# Patient Record
Sex: FEMALE | Race: WHITE | NOT HISPANIC OR LATINO | URBAN - METROPOLITAN AREA
[De-identification: names, ages, dates, MRNs, and addresses within clinical notes are randomized per-mention and may not be internally consistent; named-entity substitution may affect disease eponyms.]

---

## 2017-09-05 ENCOUNTER — INPATIENT (INPATIENT)
Facility: HOSPITAL | Age: 52
LOS: 0 days | Discharge: HOME | End: 2017-09-06
Attending: HOSPITALIST | Admitting: FAMILY MEDICINE
Payer: COMMERCIAL

## 2017-09-05 VITALS — WEIGHT: 134.92 LBS | HEIGHT: 55 IN

## 2017-09-05 LAB
ALBUMIN SERPL ELPH-MCNC: 4.5 G/DL — SIGNIFICANT CHANGE UP (ref 3.3–5)
ALP SERPL-CCNC: 43 U/L — SIGNIFICANT CHANGE UP (ref 40–120)
ALT FLD-CCNC: 24 U/L — SIGNIFICANT CHANGE UP (ref 12–78)
ANION GAP SERPL CALC-SCNC: 7 MMOL/L — SIGNIFICANT CHANGE UP (ref 5–17)
APTT BLD: 31.7 SEC — SIGNIFICANT CHANGE UP (ref 27.5–37.4)
AST SERPL-CCNC: 25 U/L — SIGNIFICANT CHANGE UP (ref 15–37)
BASOPHILS # BLD AUTO: 0.1 K/UL — SIGNIFICANT CHANGE UP (ref 0–0.2)
BASOPHILS NFR BLD AUTO: 0.7 % — SIGNIFICANT CHANGE UP (ref 0–2)
BILIRUB SERPL-MCNC: 0.5 MG/DL — SIGNIFICANT CHANGE UP (ref 0.2–1.2)
BUN SERPL-MCNC: 14 MG/DL — SIGNIFICANT CHANGE UP (ref 7–23)
CALCIUM SERPL-MCNC: 9.2 MG/DL — SIGNIFICANT CHANGE UP (ref 8.5–10.1)
CHLORIDE SERPL-SCNC: 103 MMOL/L — SIGNIFICANT CHANGE UP (ref 96–108)
CK SERPL-CCNC: 81 U/L — SIGNIFICANT CHANGE UP (ref 26–192)
CO2 SERPL-SCNC: 27 MMOL/L — SIGNIFICANT CHANGE UP (ref 22–31)
CREAT SERPL-MCNC: 0.78 MG/DL — SIGNIFICANT CHANGE UP (ref 0.5–1.3)
D DIMER BLD IA.RAPID-MCNC: 248 NG/ML DDU — HIGH
EOSINOPHIL # BLD AUTO: 0.2 K/UL — SIGNIFICANT CHANGE UP (ref 0–0.5)
EOSINOPHIL NFR BLD AUTO: 1.9 % — SIGNIFICANT CHANGE UP (ref 0–6)
GLUCOSE SERPL-MCNC: 84 MG/DL — SIGNIFICANT CHANGE UP (ref 70–99)
HCT VFR BLD CALC: 41.2 % — SIGNIFICANT CHANGE UP (ref 34.5–45)
HGB BLD-MCNC: 14.3 G/DL — SIGNIFICANT CHANGE UP (ref 11.5–15.5)
INR BLD: 1.05 RATIO — SIGNIFICANT CHANGE UP (ref 0.88–1.16)
LYMPHOCYTES # BLD AUTO: 1.8 K/UL — SIGNIFICANT CHANGE UP (ref 1–3.3)
LYMPHOCYTES # BLD AUTO: 21.2 % — SIGNIFICANT CHANGE UP (ref 13–44)
MCHC RBC-ENTMCNC: 30.5 PG — SIGNIFICANT CHANGE UP (ref 27–34)
MCHC RBC-ENTMCNC: 34.6 GM/DL — SIGNIFICANT CHANGE UP (ref 32–36)
MCV RBC AUTO: 88.2 FL — SIGNIFICANT CHANGE UP (ref 80–100)
MONOCYTES # BLD AUTO: 0.5 K/UL — SIGNIFICANT CHANGE UP (ref 0–0.9)
MONOCYTES NFR BLD AUTO: 6 % — SIGNIFICANT CHANGE UP (ref 2–14)
NEUTROPHILS # BLD AUTO: 6.1 K/UL — SIGNIFICANT CHANGE UP (ref 1.8–7.4)
NEUTROPHILS NFR BLD AUTO: 70.3 % — SIGNIFICANT CHANGE UP (ref 43–77)
NT-PROBNP SERPL-SCNC: 61 PG/ML — SIGNIFICANT CHANGE UP (ref 0–125)
PLATELET # BLD AUTO: 272 K/UL — SIGNIFICANT CHANGE UP (ref 150–400)
POTASSIUM SERPL-MCNC: 3.8 MMOL/L — SIGNIFICANT CHANGE UP (ref 3.5–5.3)
POTASSIUM SERPL-SCNC: 3.8 MMOL/L — SIGNIFICANT CHANGE UP (ref 3.5–5.3)
PROT SERPL-MCNC: 7.9 GM/DL — SIGNIFICANT CHANGE UP (ref 6–8.3)
PROTHROM AB SERPL-ACNC: 11.3 SEC — SIGNIFICANT CHANGE UP (ref 9.8–12.7)
RBC # BLD: 4.68 M/UL — SIGNIFICANT CHANGE UP (ref 3.8–5.2)
RBC # FLD: 11.7 % — SIGNIFICANT CHANGE UP (ref 10.3–14.5)
SODIUM SERPL-SCNC: 137 MMOL/L — SIGNIFICANT CHANGE UP (ref 135–145)
TROPONIN I SERPL-MCNC: <0.015 NG/ML — SIGNIFICANT CHANGE UP (ref 0.01–0.04)
WBC # BLD: 8.7 K/UL — SIGNIFICANT CHANGE UP (ref 3.8–10.5)
WBC # FLD AUTO: 8.7 K/UL — SIGNIFICANT CHANGE UP (ref 3.8–10.5)

## 2017-09-05 PROCEDURE — 93010 ELECTROCARDIOGRAM REPORT: CPT

## 2017-09-05 PROCEDURE — 71275 CT ANGIOGRAPHY CHEST: CPT | Mod: 26

## 2017-09-05 PROCEDURE — 71020: CPT | Mod: 26

## 2017-09-05 PROCEDURE — 99285 EMERGENCY DEPT VISIT HI MDM: CPT

## 2017-09-05 RX ORDER — SODIUM CHLORIDE 9 MG/ML
3 INJECTION INTRAMUSCULAR; INTRAVENOUS; SUBCUTANEOUS ONCE
Qty: 0 | Refills: 0 | Status: COMPLETED | OUTPATIENT
Start: 2017-09-05 | End: 2017-09-05

## 2017-09-05 RX ADMIN — SODIUM CHLORIDE 3 MILLILITER(S): 9 INJECTION INTRAMUSCULAR; INTRAVENOUS; SUBCUTANEOUS at 21:11

## 2017-09-05 NOTE — ED STATDOCS - CHEST RISK OF DEAD HOSPITALIZATION
An abnormal EKG requiring continuous monitoring/Persistent or servere chest pain or anginal equivalent pain

## 2017-09-05 NOTE — ED STATDOCS - OBJECTIVE STATEMENT
50 y/o female with PMHx of Hypothyroidism presents to the ED c/o intermittent chest pain, tightness x 6 days. On synthroid. Took ASA PTA. Denies abd pain, back pain.

## 2017-09-05 NOTE — ED STATDOCS - PROGRESS NOTE DETAILS
pt with negative cta, abnormal ekg, q waves in v1 and v2, with intermittent non reproducable chest pain, pt tba, took asa pta

## 2017-09-05 NOTE — ED ADULT NURSE NOTE - OBJECTIVE STATEMENT
Pt c/o chest pain which started Thursday but has since worsened. Now pt states she came in because pain hasn't gone away. Feels it in L chest region side by ribs.

## 2017-09-05 NOTE — ED STATDOCS - EYES, MLM
2/5/2018              Mic Garcia        [address] [facility]         Dear Leeann Ambriz,    It was a pleasure to see you. Your PAP test was normal.  There is no need for further testing at this time.   I look forward to seeing you at you clear bilaterally.  Pupils equal, round, and reactive to light.

## 2017-09-06 ENCOUNTER — TRANSCRIPTION ENCOUNTER (OUTPATIENT)
Age: 52
End: 2017-09-06

## 2017-09-06 VITALS
DIASTOLIC BLOOD PRESSURE: 67 MMHG | TEMPERATURE: 98 F | HEART RATE: 60 BPM | RESPIRATION RATE: 18 BRPM | SYSTOLIC BLOOD PRESSURE: 106 MMHG | OXYGEN SATURATION: 98 %

## 2017-09-06 LAB
ALBUMIN SERPL ELPH-MCNC: 4 G/DL — SIGNIFICANT CHANGE UP (ref 3.3–5)
ALP SERPL-CCNC: 39 U/L — LOW (ref 40–120)
ALT FLD-CCNC: 19 U/L — SIGNIFICANT CHANGE UP (ref 12–78)
ANION GAP SERPL CALC-SCNC: 5 MMOL/L — SIGNIFICANT CHANGE UP (ref 5–17)
AST SERPL-CCNC: 23 U/L — SIGNIFICANT CHANGE UP (ref 15–37)
BASOPHILS # BLD AUTO: 0.1 K/UL — SIGNIFICANT CHANGE UP (ref 0–0.2)
BASOPHILS NFR BLD AUTO: 1.1 % — SIGNIFICANT CHANGE UP (ref 0–2)
BILIRUB SERPL-MCNC: 0.6 MG/DL — SIGNIFICANT CHANGE UP (ref 0.2–1.2)
BUN SERPL-MCNC: 10 MG/DL — SIGNIFICANT CHANGE UP (ref 7–23)
CALCIUM SERPL-MCNC: 9.1 MG/DL — SIGNIFICANT CHANGE UP (ref 8.5–10.1)
CHLORIDE SERPL-SCNC: 106 MMOL/L — SIGNIFICANT CHANGE UP (ref 96–108)
CHOLEST SERPL-MCNC: 178 MG/DL — SIGNIFICANT CHANGE UP (ref 10–199)
CK SERPL-CCNC: 70 U/L — SIGNIFICANT CHANGE UP (ref 26–192)
CO2 SERPL-SCNC: 30 MMOL/L — SIGNIFICANT CHANGE UP (ref 22–31)
CREAT SERPL-MCNC: 0.72 MG/DL — SIGNIFICANT CHANGE UP (ref 0.5–1.3)
EOSINOPHIL # BLD AUTO: 0.1 K/UL — SIGNIFICANT CHANGE UP (ref 0–0.5)
EOSINOPHIL NFR BLD AUTO: 2.1 % — SIGNIFICANT CHANGE UP (ref 0–6)
GLUCOSE SERPL-MCNC: 84 MG/DL — SIGNIFICANT CHANGE UP (ref 70–99)
HCT VFR BLD CALC: 40.8 % — SIGNIFICANT CHANGE UP (ref 34.5–45)
HDLC SERPL-MCNC: 78 MG/DL — SIGNIFICANT CHANGE UP (ref 40–125)
HGB BLD-MCNC: 13.8 G/DL — SIGNIFICANT CHANGE UP (ref 11.5–15.5)
LIPID PNL WITH DIRECT LDL SERPL: 92 MG/DL — SIGNIFICANT CHANGE UP
LYMPHOCYTES # BLD AUTO: 1.5 K/UL — SIGNIFICANT CHANGE UP (ref 1–3.3)
LYMPHOCYTES # BLD AUTO: 25.5 % — SIGNIFICANT CHANGE UP (ref 13–44)
MAGNESIUM SERPL-MCNC: 2.4 MG/DL — SIGNIFICANT CHANGE UP (ref 1.6–2.6)
MCHC RBC-ENTMCNC: 30.4 PG — SIGNIFICANT CHANGE UP (ref 27–34)
MCHC RBC-ENTMCNC: 33.8 GM/DL — SIGNIFICANT CHANGE UP (ref 32–36)
MCV RBC AUTO: 89.9 FL — SIGNIFICANT CHANGE UP (ref 80–100)
MONOCYTES # BLD AUTO: 0.4 K/UL — SIGNIFICANT CHANGE UP (ref 0–0.9)
MONOCYTES NFR BLD AUTO: 6.1 % — SIGNIFICANT CHANGE UP (ref 2–14)
NEUTROPHILS # BLD AUTO: 3.9 K/UL — SIGNIFICANT CHANGE UP (ref 1.8–7.4)
NEUTROPHILS NFR BLD AUTO: 65.1 % — SIGNIFICANT CHANGE UP (ref 43–77)
PHOSPHATE SERPL-MCNC: 4.2 MG/DL — SIGNIFICANT CHANGE UP (ref 2.5–4.5)
PLATELET # BLD AUTO: 259 K/UL — SIGNIFICANT CHANGE UP (ref 150–400)
POTASSIUM SERPL-MCNC: 4 MMOL/L — SIGNIFICANT CHANGE UP (ref 3.5–5.3)
POTASSIUM SERPL-SCNC: 4 MMOL/L — SIGNIFICANT CHANGE UP (ref 3.5–5.3)
PROT SERPL-MCNC: 7.5 GM/DL — SIGNIFICANT CHANGE UP (ref 6–8.3)
RBC # BLD: 4.54 M/UL — SIGNIFICANT CHANGE UP (ref 3.8–5.2)
RBC # FLD: 11.6 % — SIGNIFICANT CHANGE UP (ref 10.3–14.5)
SODIUM SERPL-SCNC: 141 MMOL/L — SIGNIFICANT CHANGE UP (ref 135–145)
TOTAL CHOLESTEROL/HDL RATIO MEASUREMENT: 2.3 RATIO — LOW (ref 3.3–7.1)
TRIGL SERPL-MCNC: 40 MG/DL — SIGNIFICANT CHANGE UP (ref 10–149)
TROPONIN I SERPL-MCNC: <0.015 NG/ML — SIGNIFICANT CHANGE UP (ref 0.01–0.04)
TROPONIN I SERPL-MCNC: <0.015 NG/ML — SIGNIFICANT CHANGE UP (ref 0.01–0.04)
WBC # BLD: 6 K/UL — SIGNIFICANT CHANGE UP (ref 3.8–10.5)
WBC # FLD AUTO: 6 K/UL — SIGNIFICANT CHANGE UP (ref 3.8–10.5)

## 2017-09-06 PROCEDURE — 93016 CV STRESS TEST SUPVJ ONLY: CPT

## 2017-09-06 PROCEDURE — 78452 HT MUSCLE IMAGE SPECT MULT: CPT | Mod: 26

## 2017-09-06 PROCEDURE — 93018 CV STRESS TEST I&R ONLY: CPT

## 2017-09-06 PROCEDURE — 93010 ELECTROCARDIOGRAM REPORT: CPT

## 2017-09-06 RX ORDER — CLOPIDOGREL BISULFATE 75 MG/1
300 TABLET, FILM COATED ORAL ONCE
Qty: 0 | Refills: 0 | Status: COMPLETED | OUTPATIENT
Start: 2017-09-06 | End: 2017-09-06

## 2017-09-06 RX ORDER — ENOXAPARIN SODIUM 100 MG/ML
40 INJECTION SUBCUTANEOUS EVERY 24 HOURS
Qty: 0 | Refills: 0 | Status: DISCONTINUED | OUTPATIENT
Start: 2017-09-06 | End: 2017-09-06

## 2017-09-06 RX ORDER — ASPIRIN/CALCIUM CARB/MAGNESIUM 324 MG
325 TABLET ORAL DAILY
Qty: 0 | Refills: 0 | Status: DISCONTINUED | OUTPATIENT
Start: 2017-09-06 | End: 2017-09-06

## 2017-09-06 RX ORDER — NITROGLYCERIN 6.5 MG
0.4 CAPSULE, EXTENDED RELEASE ORAL
Qty: 0 | Refills: 0 | Status: DISCONTINUED | OUTPATIENT
Start: 2017-09-06 | End: 2017-09-06

## 2017-09-06 RX ORDER — LEVOTHYROXINE SODIUM 125 MCG
1 TABLET ORAL
Qty: 0 | Refills: 0 | DISCHARGE
Start: 2017-09-06

## 2017-09-06 RX ORDER — ASPIRIN/CALCIUM CARB/MAGNESIUM 324 MG
1 TABLET ORAL
Qty: 0 | Refills: 0 | DISCHARGE
Start: 2017-09-06

## 2017-09-06 RX ORDER — LEVOTHYROXINE SODIUM 125 MCG
50 TABLET ORAL DAILY
Qty: 0 | Refills: 0 | Status: DISCONTINUED | OUTPATIENT
Start: 2017-09-06 | End: 2017-09-06

## 2017-09-06 RX ORDER — LEVOTHYROXINE SODIUM 125 MCG
1 TABLET ORAL
Qty: 0 | Refills: 0 | COMMUNITY

## 2017-09-06 RX ORDER — ATORVASTATIN CALCIUM 80 MG/1
20 TABLET, FILM COATED ORAL ONCE
Qty: 0 | Refills: 0 | Status: COMPLETED | OUTPATIENT
Start: 2017-09-06 | End: 2017-09-06

## 2017-09-06 RX ADMIN — CLOPIDOGREL BISULFATE 300 MILLIGRAM(S): 75 TABLET, FILM COATED ORAL at 05:07

## 2017-09-06 RX ADMIN — Medication 50 MICROGRAM(S): at 05:07

## 2017-09-06 NOTE — H&P ADULT - NSHPPHYSICALEXAM_GEN_ALL_CORE
PHYSICAL EXAM:    Daily Height in cm: 65 (05 Sep 2017 19:38)    Daily     ICU Vital Signs Last 24 Hrs  T(C): 36.7 (05 Sep 2017 20:07), Max: 36.7 (05 Sep 2017 20:07)  T(F): 98.1 (05 Sep 2017 20:07), Max: 98.1 (05 Sep 2017 20:07)  HR: 68 (06 Sep 2017 01:08) (68 - 72)  BP: 113/74 (06 Sep 2017 01:08) (113/74 - 125/81)  BP(mean): --  ABP: --  ABP(mean): --  RR: 16 (06 Sep 2017 01:08) (16 - 19)  SpO2: 99% (06 Sep 2017 01:08) (98% - 99%)      Constitutional: Well appearing  HEENT: Atraumatic, BARAK, Normal, No congestion  Respiratory: Breath Sounds normal, no rhonchi/wheeze  Cardiovascular: N S1S2; TOVA present  Gastrointestinal: Abdomen soft, non tender, Bowel Ssounds present  Extremities: No edema, peripheral pulses present  Neurological: AAO x 3, no gross focal motor deficits  Skin: Non cellulitic, no rash, ulcers  Back: No CVA tenderness   Musculoskeletal: non tender  Breasts: Deferred  Genitourinary: deferred  Rectal: Deferred

## 2017-09-06 NOTE — CONSULT NOTE ADULT - ASSESSMENT
50 y/o female with PMHx of Hypothyroidism presents to the ED c/o intermittent left-sided chest pain.    1. CP- continues to have mild CP this AM. Will arrange for NST this AM to assess for ischemia. 2Decho also pending. Cont asa/statin. EKG with septal q waves, trops negative. No events on telemetry so far. No further plavix at this time.     2. BP/HR well controlled- cont to observe.     3. Hypothyroidism- as per primary team.    4. DVT proph, replete lytes as needed.    5. Dispo- if NST negative, can D/C and f/u with me as outpt within 1 week.

## 2017-09-06 NOTE — DISCHARGE NOTE ADULT - CARE PROVIDER_API CALL
Franco Hartmann (DO), Cardiology; Internal Medicine  [address] [place] [address]  Phone: [phone]  Fax: [phone]

## 2017-09-06 NOTE — CONSULT NOTE ADULT - SUBJECTIVE AND OBJECTIVE BOX
Cardiology Consultation    HPI: 50 y/o female with PMHx of Hypothyroidism presents to the ED c/o intermittent left  chest pain(below left breast ) nonreproducible that started 6 days ago ,reports feels like muscle tightness on and off lasting for a few seconds at a time,but has been increasing in frequency for the past 6 days .yesterday evening it was very frequent so decided to come to ED.Pain is nonradiating,not associated with nausea or vomiting or SOB or dizziness.  ,. On synthroid. Took ASA PTA. Denies abd pain, back pain. EKG- NSR q waves in septal leads V1V2  CXR negative for CHF,pleural effusion,infiltrate per my interpretation  CTA negative       9/6- Continues to have atypical CP L side of chest, under L breast. No SOB, diaphoresis, pain radiation.    Pmhx- hypothyroidism     Pshx- none    Social- smoked cigarrettes for 1 or 2 years in college and has quit since then more than 20 years ago,etoh use socially,no recreational drug use.    Fam hx- non contributory (06 Sep 2017 04:56)    Allergies  No Known Allergies    MEDICATIONS  (STANDING):  levothyroxine 50 MICROGram(s) Oral daily  aspirin 325 milliGRAM(s) Oral daily  atorvastatin 20 milliGRAM(s) Oral once  enoxaparin Injectable 40 milliGRAM(s) SubCutaneous every 24 hours    MEDICATIONS  (PRN):  nitroglycerin     SubLingual 0.4 milliGRAM(s) SubLingual every 5 minutes PRN Chest Pain    Vital Signs Last 24 Hrs  T(C): 36.9 (06 Sep 2017 05:50), Max: 36.9 (06 Sep 2017 05:50)  T(F): 98.4 (06 Sep 2017 05:50), Max: 98.4 (06 Sep 2017 05:50)  HR: 61 (06 Sep 2017 05:50) (61 - 72)  BP: 120/70 (06 Sep 2017 05:50) (113/74 - 125/81)  BP(mean): --  RR: 20 (06 Sep 2017 05:50) (16 - 20)  SpO2: 99% (06 Sep 2017 01:08) (98% - 99%)    REVIEW OF SYSTEMS:    CONSTITUTIONAL:  As per HPI.  HEENT:  Eyes:  No diplopia or blurred vision. ENT:  No earache, sore throat or runny nose.  CARDIOVASCULAR:  No pressure, squeezing, strangling, tightness, heaviness or aching about the chest, neck, axilla or epigastrium.  RESPIRATORY:  No cough, shortness of breath, PND or orthopnea.  GASTROINTESTINAL:  No nausea, vomiting or diarrhea.  GENITOURINARY:  No dysuria, frequency or urgency.  MUSCULOSKELETAL:  As per HPI.  NEUROLOGIC:  No paresthesias, fasciculations, seizures or weakness.    PHYSICAL EXAMINATION:    GENERAL APPEARANCE:  Pt. is not currently dyspneic, in no distress. Pt. is alert, oriented, and pleasant.  HEENT:  Pupils are normal and react normally. No icterus. Mucous membranes well colored.  NECK:  Supple. No lymphadenopathy. Jugular venous pressure not elevated. Carotids equal.   HEART:   The cardiac impulse has a normal quality. There are no murmurs, rubs or gallops noted  CHEST:  Chest is clear to auscultation. Normal respiratory effort.  ABDOMEN:  Soft and nontender.   EXTREMITIES:  There is no edema.     LABS:                        14.3   8.7   )-----------( 272      ( 05 Sep 2017 21:11 )             41.2     09-05    137  |  103  |  14  ----------------------------<  84  3.8   |  27  |  0.78    Ca    9.2      05 Sep 2017 21:11    TPro  7.9  /  Alb  4.5  /  TBili  0.5  /  DBili  x   /  AST  25  /  ALT  24  /  AlkPhos  43  09-05    LIVER FUNCTIONS - ( 05 Sep 2017 21:11 )  Alb: 4.5 g/dL / Pro: 7.9 gm/dL / ALK PHOS: 43 U/L / ALT: 24 U/L / AST: 25 U/L / GGT: x           PT/INR - ( 05 Sep 2017 21:11 )   PT: 11.3 sec;   INR: 1.05 ratio      PTT - ( 05 Sep 2017 21:11 )  PTT:31.7 sec  CARDIAC MARKERS ( 06 Sep 2017 00:39 )  <0.015 ng/mL / x     / x     / x     / x      CARDIAC MARKERS ( 05 Sep 2017 21:11 )  <0.015 ng/mL / x     / 81 U/L / x     / x        EKG: SR @65. Nl axis, intervals. Septal q waves, no acute ST changes.    TELEMETRY: SB/SR in 60s.    CARDIAC TESTS: pending.    RADIOLOGY & ADDITIONAL STUDIES: CTA- negative for PE.    ASSESSMENT & PLAN:

## 2017-09-06 NOTE — DISCHARGE NOTE ADULT - PATIENT PORTAL LINK FT
“You can access the Follow[employer] Patient Portal, offered by [facility], by registering with the following website: [email or web address]”

## 2017-09-06 NOTE — DISCHARGE NOTE ADULT - CARE PLAN
Principal Discharge DX:	Chest pain  Goal:	take your medicine and follow up in cardiology office for further management  Instructions for follow-up, activity and diet:	as above

## 2017-09-06 NOTE — H&P ADULT - ASSESSMENT
50 y/o female with PMHx of Hypothyroidism presents to the ED c/o intermittent left  chest pain    # new onset mild  Atypical chest pain intermittent (few seconds at atime) ,but accelerating in frequency over past 6 days R/O ACS vs musculoskeletal  # unlikely aortic dissection (no radiation to back,no significant SBP differential on B/l arms)  - Admit to tele  -YOHANNES,asa,plavix loading dose given ,started statin , NTG SL prn  -Cardio consult  - currently patient comfortable ,if has recurring pain then would consider IV heparin drip    # hx of hypothyrodism  continue synthroid    # DVt prophylaxis- lovenox sc RN/Transporter

## 2017-09-06 NOTE — ED ADULT NURSE REASSESSMENT NOTE - NS ED NURSE REASSESS COMMENT FT1
Pt received in bed. Handoff given by AVERY Albert. Appears to be comfortable. A&Ox3. MAEx4. Breathing spontaneously on RA. No SOB or cough. VS as documented. Denies chest pain or discomfort. Pt to be admittted to tele. awaiting for hospitalist. will follow up.

## 2017-09-06 NOTE — H&P ADULT - NSHPLABSRESULTS_GEN_ALL_CORE
14.3   8.7   )-----------( 272      ( 05 Sep 2017 21:11 )             41.2       CBC Full  -  ( 05 Sep 2017 21:11 )  WBC Count : 8.7 K/uL  Hemoglobin : 14.3 g/dL  Hematocrit : 41.2 %  Platelet Count - Automated : 272 K/uL  Mean Cell Volume : 88.2 fl  Mean Cell Hemoglobin : 30.5 pg  Mean Cell Hemoglobin Concentration : 34.6 gm/dL  Auto Neutrophil # : 6.1 K/uL  Auto Lymphocyte # : 1.8 K/uL  Auto Monocyte # : 0.5 K/uL  Auto Eosinophil # : 0.2 K/uL  Auto Basophil # : 0.1 K/uL  Auto Neutrophil % : 70.3 %  Auto Lymphocyte % : 21.2 %  Auto Monocyte % : 6.0 %  Auto Eosinophil % : 1.9 %  Auto Basophil % : 0.7 %      09-05    137  |  103  |  14  ----------------------------<  84  3.8   |  27  |  0.78    Ca    9.2      05 Sep 2017 21:11    TPro  7.9  /  Alb  4.5  /  TBili  0.5  /  DBili  x   /  AST  25  /  ALT  24  /  AlkPhos  43  09-05      LIVER FUNCTIONS - ( 05 Sep 2017 21:11 )  Alb: 4.5 g/dL / Pro: 7.9 gm/dL / ALK PHOS: 43 U/L / ALT: 24 U/L / AST: 25 U/L / GGT: x             PT/INR - ( 05 Sep 2017 21:11 )   PT: 11.3 sec;   INR: 1.05 ratio         PTT - ( 05 Sep 2017 21:11 )  PTT:31.7 sec    CARDIAC MARKERS ( 06 Sep 2017 00:39 )  <0.015 ng/mL / x     / x     / x     / x      CARDIAC MARKERS ( 05 Sep 2017 21:11 )  <0.015 ng/mL / x     / 81 U/L / x     / x

## 2017-09-06 NOTE — DISCHARGE NOTE ADULT - HOSPITAL COURSE
HPI:  52 y/o female with PMHx of Hypothyroidism presents to the ED c/o intermittent left  chest pain(below left breast ) nonreproducible that started 6 days ago ,reports feels like muscle tightness on and off lasting for a few seconds at a time,but has been increasing in frequency for the past 6 days     #Review of system- rest of review of systems are negative except as mentioned in HPI    PHYSICAL EXAM:    Vital Signs Last 24 Hrs  T(C): 36.8 (06 Sep 2017 10:20), Max: 36.9 (06 Sep 2017 05:50)  T(F): 98.3 (06 Sep 2017 10:20), Max: 98.4 (06 Sep 2017 05:50)  HR: 60 (06 Sep 2017 10:20) (60 - 72)  BP: 106/67 (06 Sep 2017 10:20) (106/67 - 125/81)  BP(mean): --  RR: 18 (06 Sep 2017 10:20) (16 - 20)  SpO2: 98% (06 Sep 2017 10:20) (98% - 99%)    GENERAL: comfortable   HEAD:  Atraumatic, Normocephalic  EYES: EOMI, PERRLA, conjunctiva and sclera clear  HEENT: Moist mucous membranes  NECK: Supple, No JVD  NERVOUS SYSTEM:  Alert & Oriented X3, Motor Strength 5/5 B/L upper and lower extremities; DTRs 2+ intact and symmetric  CHEST/LUNG: Clear to auscultation bilaterally; No rales, rhonchi, wheezing, or rubs  HEART:S1S2 normal, no murmer  ABDOMEN: Soft, Nontender, Nondistended; Bowel sounds present  GENITOURINARY- Voiding, no palpable bladder  EXTREMITIES:  2+ Peripheral Pulses, No clubbing, cyanosis, or edema  MUSCULOSKELTAL- No muscle tenderness, Muscle tone normal, No joint tenderness, no Joint swelling, Joint range of motion-normal  SKIN-no rash, no lesion  PSYCH- Mood stable  LYMPH Node- No palpable lymph node    LABS:                        13.8   6.0   )-----------( 259      ( 06 Sep 2017 09:36 )             40.8     09-06    141  |  106  |  10  ----------------------------<  84  4.0   |  30  |  0.72    Ca    9.1      06 Sep 2017 09:36  Phos  4.2     09-06  Mg     2.4     09-06    TPro  7.5  /  Alb  4.0  /  TBili  0.6  /  DBili  x   /  AST  23  /  ALT  19  /  AlkPhos  39<L>  09-06    PT/INR - ( 05 Sep 2017 21:11 )   PT: 11.3 sec;   INR: 1.05 ratio         PTT - ( 05 Sep 2017 21:11 )  PTT:31.7 sec      CAPILLARY BLOOD GLUCOSE          CARDIAC MARKERS ( 06 Sep 2017 09:36 )  <0.015 ng/mL / x     / 70 U/L / x     / x      CARDIAC MARKERS ( 06 Sep 2017 00:39 )  <0.015 ng/mL / x     / x     / x     / x      CARDIAC MARKERS ( 05 Sep 2017 21:11 )  <0.015 ng/mL / x     / 81 U/L / x     / x            Standing medicine  levothyroxine 50 MICROGram(s) Oral daily  aspirin 325 milliGRAM(s) Oral daily  enoxaparin Injectable 40 milliGRAM(s) SubCutaneous every 24 hours  nitroglycerin     SubLingual 0.4 milliGRAM(s) SubLingual every 5 minutes PRN    A/P    #Chest pain- most likely musculoskeletal  -work negative- stress test negative   -discussed with Dr. Hartmann- d/c home     #d/c today     #time spent more than 30 minutes

## 2017-09-06 NOTE — H&P ADULT - HISTORY OF PRESENT ILLNESS
50 y/o female with PMHx of Hypothyroidism presents to the ED c/o intermittent left  chest pain(below left breast ) nonreproducible that started 6 days ago ,reports feels like muscle tightness on and off lasting for a few seconds at a time,but has been increasing in frequency for the past 6 days .yesterday evening it was very frequent so decided to come to ED.Pain is nonradiating,not associated with nausea or vomiting or SOB or dizziness.  ,. On synthroid. Took ASA PTA. Denies abd pain, back pain. EKG- NSR q waves in septal leads V1V2  CXR negative for CHF,pleural effusion,infiltrate per my interpretation  CTA negative   Pmhx- hypothyroidism pshx- none' social- smoked cigarrettes for 1 or 2 years in college and has quit since then more than 20 years ago,etoh use socially,no recreational drug use  fam hx- non contributory

## 2017-09-12 DIAGNOSIS — E03.9 HYPOTHYROIDISM, UNSPECIFIED: ICD-10-CM

## 2017-09-12 DIAGNOSIS — Z87.891 PERSONAL HISTORY OF NICOTINE DEPENDENCE: ICD-10-CM

## 2017-09-12 DIAGNOSIS — R07.89 OTHER CHEST PAIN: ICD-10-CM

## 2017-09-12 DIAGNOSIS — R07.9 CHEST PAIN, UNSPECIFIED: ICD-10-CM

## 2019-09-01 ENCOUNTER — EMERGENCY (EMERGENCY)
Facility: HOSPITAL | Age: 54
LOS: 0 days | Discharge: HOME | End: 2019-09-01
Attending: EMERGENCY MEDICINE
Payer: COMMERCIAL

## 2019-09-01 VITALS — HEIGHT: 64 IN | WEIGHT: 138.01 LBS

## 2019-09-01 VITALS
HEART RATE: 64 BPM | RESPIRATION RATE: 18 BRPM | SYSTOLIC BLOOD PRESSURE: 111 MMHG | OXYGEN SATURATION: 100 % | TEMPERATURE: 98 F | DIASTOLIC BLOOD PRESSURE: 70 MMHG

## 2019-09-01 DIAGNOSIS — S52.502A UNSPECIFIED FRACTURE OF THE LOWER END OF LEFT RADIUS, INITIAL ENCOUNTER FOR CLOSED FRACTURE: ICD-10-CM
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
## 2019-09-01 DIAGNOSIS — E03.9 HYPOTHYROIDISM, UNSPECIFIED: ICD-10-CM

## 2019-09-01 DIAGNOSIS — S52.612A DISPLACED FRACTURE OF LEFT ULNA STYLOID PROCESS, INITIAL ENCOUNTER FOR CLOSED FRACTURE: ICD-10-CM

## 2019-09-01 PROCEDURE — 73090 X-RAY EXAM OF FOREARM: CPT | Mod: 26,LT,76

## 2019-09-01 PROCEDURE — 73090 X-RAY EXAM OF FOREARM: CPT | Mod: LT

## 2019-09-01 PROCEDURE — 25600 CLTX DST RDL FX/EPHYS SEP WO: CPT | Mod: 54

## 2019-09-01 PROCEDURE — 99285 EMERGENCY DEPT VISIT HI MDM: CPT | Mod: 25

## 2019-09-01 PROCEDURE — 25600 CLTX DST RDL FX/EPHYS SEP WO: CPT

## 2019-09-01 PROCEDURE — 73110 X-RAY EXAM OF WRIST: CPT | Mod: LT

## 2019-09-01 PROCEDURE — 73110 X-RAY EXAM OF WRIST: CPT | Mod: 26,LT

## 2019-09-01 PROCEDURE — 99284 EMERGENCY DEPT VISIT MOD MDM: CPT

## 2019-09-01 RX ORDER — OXYCODONE AND ACETAMINOPHEN 5; 325 MG/1; MG/1
1 TABLET ORAL ONCE
Refills: 0 | Status: DISCONTINUED | OUTPATIENT
Start: 2019-09-01 | End: 2019-09-01

## 2019-09-01 NOTE — ED ADULT NURSE NOTE - OBJECTIVE STATEMENT
54 y/o F presents to the ED c/o left wrist pain s/p slipp and fall when walking down steps in a boat. Pt states she missed the last step, no loc. Pain is 5/10

## 2019-09-01 NOTE — ED STATDOCS - CARE PROVIDER_API CALL
Sosa Moore)  [facility]  [address] [place] [address]  Phone: [phone]  Fax: [phone]  Follow Up Time:

## 2019-09-01 NOTE — ED STATDOCS - OBJECTIVE STATEMENT
52 y/o female with a PMHx of hypothyroidism presents to the ED c/o left wrist injury today. Pt was walking down slippery steps, fell on outstretched left hand. Now c/o left wrist pain. +tingling in left thumb. Pt did not take pain meds PTA. NKDA.

## 2019-09-01 NOTE — CONSULT NOTE ADULT - ASSESSMENT
******INCOMPLETE NOTE IN PROGRESS****** 53 F with left distal radius fracture     -HAILEY CHAUDHRY in sugar tong splint  -Keep splint c/d/i  -Educated patient on signs of compartment syndrome and carpal tunnel syndrome  -Pain control as needed  -Ice and elevate  -FU with Dr. Moore outpatient  -No acute orthopedic surgical intervention needed at this time  -Ortho stable for discharge  -Discussed with attending Dr. Moore and will advise if plan changes 53 F with left distal radius fracture     -HAILEY CHAUDHRY in sugar tong splint  -Keep splint c/d/i  -Educated patient on signs of compartment syndrome and carpal tunnel syndrome  -Pain control as needed  -Ice and elevate  -FU with Dr. Moore outpatient  ******INCOMPLETE NOTE IN PROGRESS****** 53 F with left distal radius fracture     -HAILEY CHAUDHRY in sugar tong splint  -Keep splint c/d/i  -Educated patient on signs of compartment syndrome and carpal tunnel syndrome  -Pain control as needed  -Ice and elevate  -No acute orthopedic surgical intervention needed at this time  -Recommend FU with Dr. Moore outpatient within 1 week, Please call office for appointment  -Discussed with attending Dr. Moore and will advise if plan changes  -Ortho stable for discharge

## 2019-09-01 NOTE — ED STATDOCS - PATIENT PORTAL LINK FT
You can access the FollowMyHealth Patient Portal offered by [facility] by registering at the following website: [email or web address]. By joining [employer]’s FollowMyHealth portal, you will also be able to view your health information using other applications (apps) compatible with our system.

## 2019-09-01 NOTE — ED STATDOCS - PROGRESS NOTE DETAILS
+Left wrist fracture. will call ortho dr. Moore. ~KAREN Amin Ortho resident Dr. Gruber is seeing patient now to perform closed reduction. Will dc after post reduction xrays. Care transitioned to KAREN Ortega. ~KAREN Amin signed Stephanie Jacob PA-C Received patient in sign out from KAREN Pereira.   Pt may DC home as per ortho.

## 2019-09-01 NOTE — CONSULT NOTE ADULT - SUBJECTIVE AND OBJECTIVE BOX
53 F presents with left wrist pain s/p fall. Patient was walking down steps on a boat when she slipped and fell. She denies headstrike/LOC/ other orthopedic injuries at this time. Denies numbness/tingling.     PAST MEDICAL & SURGICAL HISTORY:  Hypothyroidism    Home Medications:  aspirin 325 mg oral tablet: 1 tab(s) orally once a day (06 Sep 2017 15:47)  levothyroxine 50 mcg (0.05 mg) oral tablet: 1 tab(s) orally once a day (06 Sep 2017 15:47)    Allergies    No Known Allergies    Intolerances      LABS:      Vital Signs Last 24 Hrs  T(C): 36.9 (01 Sep 2019 18:17), Max: 36.9 (01 Sep 2019 18:17)  T(F): 98.5 (01 Sep 2019 18:17), Max: 98.5 (01 Sep 2019 18:17)  HR: 64 (01 Sep 2019 18:17) (64 - 64)  BP: 111/70 (01 Sep 2019 18:17) (111/70 - 111/70)  BP(mean): --  RR: 18 (01 Sep 2019 18:17) (18 - 18)  SpO2: 100% (01 Sep 2019 18:17) (100% - 100%)      PHYSICAL EXAM:  GEN: NAD, awake and alert    SPINE: Skin intact, no bony tenderness or step-offs appreciated throughout cervical/thoracic/lumbar/sacral spine    LUE: Skin intact, no erythema, + ecchymosis, + edema, + gross deformity, TTP over the bony prominences of the wrist, NTTP over the bony prominences of the shoulder/elbow/hand, painful passive/active ROM of the wrist, painless passive/active ROM of the shoulder/elbow/hand, C5-T1 SILT, motor grossly intact throughout axillary/musculocutaenous/radial/median/ulnar nerves, + radial pulse    RUE: Skin intact, no erythema, ecchymosis, edema, gross deformity, NTTP over the bony prominences of the shoulder/elbow/wrist/hand, painless passive/active ROM of the shoulder/elbow/wrist/hand, C5-T1 SILT, motor grossly intact throughout axillary/musculocutaenous/radial/median/ulnar nerves, + radial pulse    BLLE: Skin intact, no erythema, ecchymosis, edema, gross deformity, NTTP over the bony prominences of the hip/knee/ankle/foot, painless passive/active ROM of the hip/knee/ankle/foot, L2-S1 SILT, motor grossly intact throughout hip flexors/quads/hams/TA/EHL/FHL/GSC, + DP/PT pulses, no pain with log roll, no pain on axial loading, compartments soft and compressible, calves nontender    XR Left Wrist: Distal radius fracture     Procedure:  Procedure explained and discussed risks, benefits, and alternatives to procedure with patient and father at bedside. Patient expressed understanding and all questions were answered. Under aseptic conditions, a hematoma block was administered to the fracture site using 10cc of 1% lidocaine. Closed reduction was performed and a well molded plaster splint was applied. The patient tolerated the procedure well and there we no complications. The patient was neurovascularly intact following reduction. Post-reduction x-rays demonstrated acceptable alignment. 53 F presents with left wrist pain s/p fall. Patient was walking down steps on a boat when she slipped and fell. She denies headstrike/LOC/other orthopedic injuries at this time. Denies numbness/tingling.     PAST MEDICAL & SURGICAL HISTORY:  Hypothyroidism    Home Medications:  aspirin 325 mg oral tablet: 1 tab(s) orally once a day (06 Sep 2017 15:47)  levothyroxine 50 mcg (0.05 mg) oral tablet: 1 tab(s) orally once a day (06 Sep 2017 15:47)    Allergies    No Known Allergies    Intolerances      LABS:      Vital Signs Last 24 Hrs  T(C): 36.9 (01 Sep 2019 18:17), Max: 36.9 (01 Sep 2019 18:17)  T(F): 98.5 (01 Sep 2019 18:17), Max: 98.5 (01 Sep 2019 18:17)  HR: 64 (01 Sep 2019 18:17) (64 - 64)  BP: 111/70 (01 Sep 2019 18:17) (111/70 - 111/70)  BP(mean): --  RR: 18 (01 Sep 2019 18:17) (18 - 18)  SpO2: 100% (01 Sep 2019 18:17) (100% - 100%)      PHYSICAL EXAM:  GEN: NAD, awake and alert    SPINE: Skin intact, no bony tenderness or step-offs appreciated throughout cervical/thoracic/lumbar/sacral spine    LUE: Skin intact, no erythema, + ecchymosis, + edema, + gross deformity, TTP over the bony prominences of the wrist, NTTP over the bony prominences of the shoulder/elbow/hand, painful passive/active ROM of the wrist, painless passive/active ROM of the shoulder/elbow/hand, C5-T1 SILT, motor grossly intact throughout axillary/musculocutaenous/radial/median/ulnar nerves, + radial pulse    RUE: Skin intact, no erythema, ecchymosis, edema, gross deformity, NTTP over the bony prominences of the shoulder/elbow/wrist/hand, painless passive/active ROM of the shoulder/elbow/wrist/hand, C5-T1 SILT, motor grossly intact throughout axillary/musculocutaenous/radial/median/ulnar nerves, + radial pulse    BLLE: Skin intact, no erythema, ecchymosis, edema, gross deformity, NTTP over the bony prominences of the hip/knee/ankle/foot, painless passive/active ROM of the hip/knee/ankle/foot, L2-S1 SILT, motor grossly intact throughout hip flexors/quads/hams/TA/EHL/FHL/GSC, + DP/PT pulses, no pain with log roll, no pain on axial loading, compartments soft and compressible, calves nontender    XR Left Wrist: Distal radius fracture     Procedure:  Procedure explained and discussed risks, benefits, and alternatives to procedure with patient and father at bedside. Patient expressed understanding and all questions were answered. Under aseptic conditions, a hematoma block was administered to the fracture site using 10cc of 1% lidocaine. Closed reduction was performed and a well molded plaster splint was applied. The patient tolerated the procedure well and there we no complications. The patient was neurovascularly intact following reduction. Post-reduction x-rays demonstrated acceptable alignment. 53 F right hand dominant presents with left wrist pain s/p fall. Patient was walking down steps on a boat when she slipped and fell. She denies headstrike/LOC/other orthopedic injuries at this time. Denies numbness/tingling.     PAST MEDICAL & SURGICAL HISTORY:  Hypothyroidism    Home Medications:  aspirin 325 mg oral tablet: 1 tab(s) orally once a day (06 Sep 2017 15:47)  levothyroxine 50 mcg (0.05 mg) oral tablet: 1 tab(s) orally once a day (06 Sep 2017 15:47)    Allergies    No Known Allergies    Intolerances      LABS:      Vital Signs Last 24 Hrs  T(C): 36.9 (01 Sep 2019 18:17), Max: 36.9 (01 Sep 2019 18:17)  T(F): 98.5 (01 Sep 2019 18:17), Max: 98.5 (01 Sep 2019 18:17)  HR: 64 (01 Sep 2019 18:17) (64 - 64)  BP: 111/70 (01 Sep 2019 18:17) (111/70 - 111/70)  BP(mean): --  RR: 18 (01 Sep 2019 18:17) (18 - 18)  SpO2: 100% (01 Sep 2019 18:17) (100% - 100%)      PHYSICAL EXAM:  GEN: NAD, awake and alert    LUE: Skin intact, no erythema, + ecchymosis, + edema, + gross deformity of distal radius, TTP over the bony prominences of the wrist, NTTP over the bony prominences of the shoulder/elbow/hand, unable to range wrist 2/2 pain, painless passive/active ROM of the shoulder/elbow/hand, C5-T1 SILT, motor grossly intact throughout axillary/musculocutaenous/radial/median/ulnar nerves, + radial pulse      Secondary Exam:     SPINE: Skin intact, no bony tenderness or step-offs appreciated throughout cervical/thoracic/lumbar/sacral spine    RUE: Skin intact, no erythema, ecchymosis, edema, gross deformity, NTTP over the bony prominences of the shoulder/elbow/wrist/hand, painless passive/active ROM of the shoulder/elbow/wrist/hand, C5-T1 SILT, motor grossly intact throughout axillary/musculocutaenous/radial/median/ulnar nerves, + radial pulse    BLLE: Skin intact, no erythema, ecchymosis, edema, gross deformity, NTTP over the bony prominences of the hip/knee/ankle/foot, painless passive/active ROM of the hip/knee/ankle/foot, L2-S1 SILT, motor grossly intact throughout hip flexors/quads/hams/TA/EHL/FHL/GSC, + DP/PT pulses, no pain with log roll, no pain on axial loading, compartments soft and compressible, calves nontender    Imaging:  XR Left Wrist: Distal radius fracture with associated ulnar styloid    Procedure:  Procedure explained and discussed risks, benefits, and alternatives to procedure with patient at bedside. Patient expressed understanding and all questions were answered. Under aseptic conditions, a hematoma block was administered to the fracture site using 10cc of 1% lidocaine. Closed reduction was performed and a well molded plaster splint was applied. The patient tolerated the procedure well and there we no complications. The patient was neurovascularly intact following reduction. Post-reduction x-rays demonstrated acceptable alignment.

## 2019-09-01 NOTE — ED STATDOCS - PHYSICAL EXAMINATION
GEN: AOX3, NAD. HEENT: Throat clear. Airway is patent. EYES: PERRLA. EOMI. Head: NC/AT. NECK: Supple, No JVD. FROM. C-spine non-tender. CV:S1S2, RRR, LUNGS: CTA b/l, no w/r/r. CHEST: Equal chest expansion and rise. No deformity. ABD: Soft, NT/ND, no rebound, no guarding. No CVAT. EXT: No e/c/c. 2+ distal pulses. LEFT WRIST: +Slight deformity left wrist. +STS. +Tenderness. Painful ROM. NVI. 2+ distal pulses. SKIN: No rashes. NEURO: No focal deficits. CN II-XII intact. FROM. 5/5 motor and sensory. KAREN Amin

## 2019-09-01 NOTE — ED ADULT TRIAGE NOTE - CHIEF COMPLAINT QUOTE
pt aaox4, pt presents to er for left wrist pain, pt states she slipped and fell on left side.  +deformity noted over the left wrist.

## 2019-09-02 PROBLEM — E03.9 HYPOTHYROIDISM, UNSPECIFIED: Chronic | Status: ACTIVE | Noted: 2017-09-06

## 2019-09-03 ENCOUNTER — INPATIENT (INPATIENT)
Facility: HOSPITAL | Age: 54
LOS: 0 days | Discharge: HOME | DRG: 502 | End: 2019-09-03
Attending: ORTHOPAEDIC SURGERY | Admitting: ORTHOPAEDIC SURGERY
Payer: COMMERCIAL

## 2019-09-03 ENCOUNTER — TRANSCRIPTION ENCOUNTER (OUTPATIENT)
Age: 54
End: 2019-09-03

## 2019-09-03 VITALS
RESPIRATION RATE: 16 BRPM | SYSTOLIC BLOOD PRESSURE: 105 MMHG | DIASTOLIC BLOOD PRESSURE: 66 MMHG | HEART RATE: 64 BPM | OXYGEN SATURATION: 96 % | TEMPERATURE: 98 F

## 2019-09-03 VITALS — WEIGHT: 130.07 LBS

## 2019-09-03 DIAGNOSIS — S62.102D FRACTURE OF UNSPECIFIED CARPAL BONE, LEFT WRIST, SUBSEQUENT ENCOUNTER FOR FRACTURE WITH ROUTINE HEALING: ICD-10-CM

## 2019-09-03 DIAGNOSIS — M25.532 PAIN IN LEFT WRIST: ICD-10-CM

## 2019-09-03 LAB
ALBUMIN SERPL ELPH-MCNC: 4.2 G/DL — SIGNIFICANT CHANGE UP (ref 3.3–5)
ALP SERPL-CCNC: 42 U/L — SIGNIFICANT CHANGE UP (ref 40–120)
ALT FLD-CCNC: 20 U/L — SIGNIFICANT CHANGE UP (ref 12–78)
ANION GAP SERPL CALC-SCNC: 6 MMOL/L — SIGNIFICANT CHANGE UP (ref 5–17)
APTT BLD: 37.9 SEC — HIGH (ref 27.5–36.3)
AST SERPL-CCNC: 19 U/L — SIGNIFICANT CHANGE UP (ref 15–37)
BILIRUB SERPL-MCNC: 0.5 MG/DL — SIGNIFICANT CHANGE UP (ref 0.2–1.2)
BUN SERPL-MCNC: 12 MG/DL — SIGNIFICANT CHANGE UP (ref 7–23)
CALCIUM SERPL-MCNC: 8.7 MG/DL — SIGNIFICANT CHANGE UP (ref 8.5–10.1)
CHLORIDE SERPL-SCNC: 107 MMOL/L — SIGNIFICANT CHANGE UP (ref 96–108)
CO2 SERPL-SCNC: 30 MMOL/L — SIGNIFICANT CHANGE UP (ref 22–31)
CREAT SERPL-MCNC: 0.79 MG/DL — SIGNIFICANT CHANGE UP (ref 0.5–1.3)
GLUCOSE SERPL-MCNC: 86 MG/DL — SIGNIFICANT CHANGE UP (ref 70–99)
HCT VFR BLD CALC: 41.6 % — SIGNIFICANT CHANGE UP (ref 34.5–45)
HGB BLD-MCNC: 14 G/DL — SIGNIFICANT CHANGE UP (ref 11.5–15.5)
INR BLD: 1.04 RATIO — SIGNIFICANT CHANGE UP (ref 0.88–1.16)
MCHC RBC-ENTMCNC: 30.8 PG — SIGNIFICANT CHANGE UP (ref 27–34)
MCHC RBC-ENTMCNC: 33.7 GM/DL — SIGNIFICANT CHANGE UP (ref 32–36)
MCV RBC AUTO: 91.4 FL — SIGNIFICANT CHANGE UP (ref 80–100)
PLATELET # BLD AUTO: 248 K/UL — SIGNIFICANT CHANGE UP (ref 150–400)
POTASSIUM SERPL-MCNC: 3.8 MMOL/L — SIGNIFICANT CHANGE UP (ref 3.5–5.3)
POTASSIUM SERPL-SCNC: 3.8 MMOL/L — SIGNIFICANT CHANGE UP (ref 3.5–5.3)
PROT SERPL-MCNC: 7.8 GM/DL — SIGNIFICANT CHANGE UP (ref 6–8.3)
PROTHROM AB SERPL-ACNC: 11.6 SEC — SIGNIFICANT CHANGE UP (ref 10–12.9)
RBC # BLD: 4.55 M/UL — SIGNIFICANT CHANGE UP (ref 3.8–5.2)
RBC # FLD: 12.9 % — SIGNIFICANT CHANGE UP (ref 10.3–14.5)
SODIUM SERPL-SCNC: 143 MMOL/L — SIGNIFICANT CHANGE UP (ref 135–145)
WBC # BLD: 7.12 K/UL — SIGNIFICANT CHANGE UP (ref 3.8–10.5)
WBC # FLD AUTO: 7.12 K/UL — SIGNIFICANT CHANGE UP (ref 3.8–10.5)

## 2019-09-03 PROCEDURE — 25280 REVISE WRIST/FOREARM TENDON: CPT | Mod: AS

## 2019-09-03 PROCEDURE — 85027 COMPLETE CBC AUTOMATED: CPT

## 2019-09-03 PROCEDURE — C1713: CPT

## 2019-09-03 PROCEDURE — 93005 ELECTROCARDIOGRAM TRACING: CPT

## 2019-09-03 PROCEDURE — 85730 THROMBOPLASTIN TIME PARTIAL: CPT

## 2019-09-03 PROCEDURE — 73130 X-RAY EXAM OF HAND: CPT | Mod: 26,LT

## 2019-09-03 PROCEDURE — 73090 X-RAY EXAM OF FOREARM: CPT | Mod: 26,LT

## 2019-09-03 PROCEDURE — 85610 PROTHROMBIN TIME: CPT

## 2019-09-03 PROCEDURE — 93010 ELECTROCARDIOGRAM REPORT: CPT

## 2019-09-03 PROCEDURE — 73110 X-RAY EXAM OF WRIST: CPT | Mod: 26,LT

## 2019-09-03 PROCEDURE — 36415 COLL VENOUS BLD VENIPUNCTURE: CPT

## 2019-09-03 PROCEDURE — 25609 OPTX DST RD XART FX/EP SEP3+: CPT | Mod: AS

## 2019-09-03 PROCEDURE — 80053 COMPREHEN METABOLIC PANEL: CPT

## 2019-09-03 RX ORDER — ONDANSETRON 8 MG/1
4 TABLET, FILM COATED ORAL EVERY 6 HOURS
Refills: 0 | Status: DISCONTINUED | OUTPATIENT
Start: 2019-09-03 | End: 2019-09-03

## 2019-09-03 RX ORDER — FENTANYL CITRATE 50 UG/ML
50 INJECTION INTRAVENOUS
Refills: 0 | Status: DISCONTINUED | OUTPATIENT
Start: 2019-09-03 | End: 2019-09-03

## 2019-09-03 RX ORDER — ONDANSETRON 8 MG/1
4 TABLET, FILM COATED ORAL ONCE
Refills: 0 | Status: DISCONTINUED | OUTPATIENT
Start: 2019-09-03 | End: 2019-09-03

## 2019-09-03 RX ORDER — LEVOTHYROXINE SODIUM 125 MCG
50 TABLET ORAL DAILY
Refills: 0 | Status: DISCONTINUED | OUTPATIENT
Start: 2019-09-03 | End: 2019-09-03

## 2019-09-03 RX ORDER — OXYCODONE HYDROCHLORIDE 5 MG/1
5 TABLET ORAL ONCE
Refills: 0 | Status: DISCONTINUED | OUTPATIENT
Start: 2019-09-03 | End: 2019-09-03

## 2019-09-03 RX ORDER — ACETAMINOPHEN 500 MG
1000 TABLET ORAL ONCE
Refills: 0 | Status: COMPLETED | OUTPATIENT
Start: 2019-09-03 | End: 2019-09-03

## 2019-09-03 RX ORDER — OXYCODONE HYDROCHLORIDE 5 MG/1
10 TABLET ORAL EVERY 4 HOURS
Refills: 0 | Status: DISCONTINUED | OUTPATIENT
Start: 2019-09-03 | End: 2019-09-03

## 2019-09-03 RX ORDER — CEPHALEXIN 500 MG
1 CAPSULE ORAL
Qty: 12 | Refills: 0
Start: 2019-09-03 | End: 2019-09-05

## 2019-09-03 RX ORDER — OXYCODONE HYDROCHLORIDE 5 MG/1
5 TABLET ORAL
Refills: 0 | Status: DISCONTINUED | OUTPATIENT
Start: 2019-09-03 | End: 2019-09-03

## 2019-09-03 RX ORDER — SODIUM CHLORIDE 9 MG/ML
1000 INJECTION, SOLUTION INTRAVENOUS
Refills: 0 | Status: DISCONTINUED | OUTPATIENT
Start: 2019-09-03 | End: 2019-09-03

## 2019-09-03 RX ORDER — MEPERIDINE HYDROCHLORIDE 50 MG/ML
12.5 INJECTION INTRAMUSCULAR; INTRAVENOUS; SUBCUTANEOUS
Refills: 0 | Status: DISCONTINUED | OUTPATIENT
Start: 2019-09-03 | End: 2019-09-03

## 2019-09-03 RX ORDER — INFLUENZA VIRUS VACCINE 15; 15; 15; 15 UG/.5ML; UG/.5ML; UG/.5ML; UG/.5ML
0.5 SUSPENSION INTRAMUSCULAR ONCE
Refills: 0 | Status: DISCONTINUED | OUTPATIENT
Start: 2019-09-03 | End: 2019-09-03

## 2019-09-03 RX ADMIN — Medication 400 MILLIGRAM(S): at 20:15

## 2019-09-03 RX ADMIN — FENTANYL CITRATE 50 MICROGRAM(S): 50 INJECTION INTRAVENOUS at 20:30

## 2019-09-03 RX ADMIN — SODIUM CHLORIDE 75 MILLILITER(S): 9 INJECTION, SOLUTION INTRAVENOUS at 14:24

## 2019-09-03 RX ADMIN — FENTANYL CITRATE 50 MICROGRAM(S): 50 INJECTION INTRAVENOUS at 20:14

## 2019-09-03 NOTE — H&P ADULT - ASSESSMENT
53 F with left distal radius fracture in sugar splint for ORIF today with Dr. Hill    -OR today for ORIF of left wrist with Dr. Hill  -FU preop labs  -No medical clearance needed as per Dr. Hill  -No CXR needed as per Dr. Hill  -Discussed with attending Dr. Hill who agrees with plan, will advise if plan changes    ******INCOMPLETE NOTE IN PROGRESS****** 53 F with left distal radius fracture in sugar splint for ORIF today with Dr. Hill    -OR today for ORIF of left wrist with Dr. Hill  -FU preop labs  -No medical clearance needed as per Dr. Hill  -No CXR needed as per Dr. Hill  -Discussed with attending Dr. Hill who agrees with plan, will advise if plan changes

## 2019-09-03 NOTE — ED ADULT NURSE NOTE - OBJECTIVE STATEMENT
pt fell on Sunday ,left wrist fructure ,splint to left arm . will go to the OR today .pt refused pain medication at this time.

## 2019-09-03 NOTE — ED STATDOCS - PROGRESS NOTE DETAILS
Saw pt with ER attending, pt fell 2 days ago tripping and falling down steps in a boat and fracturing her left  wrist, was seen in the ER , splint applied, comes to ER for to meet Dr. Hill, has had intermittent tingling of her fingers, continues swelling no color changes to her nail beds or fingers

## 2019-09-03 NOTE — DISCHARGE NOTE NURSING/CASE MANAGEMENT/SOCIAL WORK - PATIENT PORTAL LINK FT
You can access the FollowMyHealth Patient Portal offered by [facility] by registering at the following website: [email or web address]. By joining [facility]’s FollowMyHealth portal, you will also be able to view your health information using other applications (apps) compatible with our system.

## 2019-09-03 NOTE — DISCHARGE NOTE PROVIDER - NSDCCPTREATMENT_GEN_ALL_CORE_FT
PRINCIPAL PROCEDURE  Procedure: Open reduction and internal fixation (ORIF) of 2-part intra-articular fracture of distal radius  Findings and Treatment:

## 2019-09-03 NOTE — CHART NOTE - NSCHARTNOTEFT_GEN_A_CORE
Ortho Preop Note    Patient is a 53y old  Female who presents with a chief complaint of Left distal radius ORIF (03 Sep 2019 14:08)    Diagnosis: Left distal radius fracture  Procedure: ORIF of left distal radius  Surgeon: Dr. Hill                          14.0   7.12  )-----------( 248      ( 03 Sep 2019 13:33 )             41.6     09-03    143  |  107  |  12  ----------------------------<  86  3.8   |  30  |  0.79    Ca    8.7      03 Sep 2019 13:31    TPro  7.8  /  Alb  4.2  /  TBili  0.5  /  DBili  x   /  AST  19  /  ALT  20  /  AlkPhos  42  09-03    PT/INR - ( 03 Sep 2019 13:31 )   PT: 11.6 sec;   INR: 1.04 ratio         PTT - ( 03 Sep 2019 13:31 )  PTT:37.9 sec      [ ] Type & Screen: Not done per request of Dr. Hill  [ ] CBC: Completed  [ ] BMP: Completed  [ ] PT/PTT/INR: Completed  [ ] Chest X-ray: Not done per request of Dr. Hill  [ ] EKG: Completed and in chart  [ ] NPO/IVF: NPO  [ ] Consent: in chart  [ ] Clearance: Not needed per Dr. Hill  [ ] Added on to OR Schedule: Completed  [ ] Anti-coagulation held: N/a       Assessment & Plan:  53yFemale with Left distal radius fracture  -For OR 9/3    Ortho Pager [phone] Ortho Preop Note    Patient is a 53y old  Female who presents with a chief complaint of Left distal radius ORIF (03 Sep 2019 14:08)    Diagnosis: Left distal radius fracture  Procedure: ORIF of left distal radius  Surgeon: Dr. Hill                          14.0   7.12  )-----------( 248      ( 03 Sep 2019 13:33 )             41.6     09-03    143  |  107  |  12  ----------------------------<  86  3.8   |  30  |  0.79    Ca    8.7      03 Sep 2019 13:31    TPro  7.8  /  Alb  4.2  /  TBili  0.5  /  DBili  x   /  AST  19  /  ALT  20  /  AlkPhos  42  09-03    PT/INR - ( 03 Sep 2019 13:31 )   PT: 11.6 sec;   INR: 1.04 ratio         PTT - ( 03 Sep 2019 13:31 )  PTT:37.9 sec      [ ] Type & Screen: Not done per request of Dr. Hill  [ ] CBC: Completed  [ ] BMP: Completed  [ ] PT/PTT/INR: Completed  [ ] Chest X-ray: Not done per request of Dr. Hill  [ ] EKG: Completed and in chart  [ ] NPO/IVF: NPO  [ ] Consent: in chart  [ ] Clearance: Not needed per Dr. Hill  [ ] Added on to OR Schedule: Completed  [ ] Anti-coagulation held: N/a       Assessment & Plan:  53yFemale with Left distal radius fracture  -For OR 9/3    Ortho Pager [phone]

## 2019-09-03 NOTE — DISCHARGE NOTE PROVIDER - HOSPITAL COURSE
Orthopedic Summary    H&P:    Pt is a 53y Female  PAST MEDICAL & SURGICAL HISTORY:    Hypothyroidism    No significant past surgical history             Now s/p ORIF Left distal radius        Hospital Course:         The patient is a 53y Female status post above. Pt sustained injury from a fall and was admitted through the ED. There were no complications during the procedure.  The patient was transferred to recovery room in stable condition and subsequently to the orthopedic floor.  Physical therapy daily and daily labs were followed.  The  splint was kept clean, dry, intact. The rest of the hospital stay was unremarkable. Plan is DC home POD0 vs POD1 depending on pain levels.

## 2019-09-03 NOTE — ED STATDOCS - ATTENDING CONTRIBUTION TO CARE
I, Jhoan Hernandez MD,  performed the initial face to face bedside interview with this patient regarding history of present illness, review of symptoms and relevant past medical, social and family history.  I completed an independent physical examination.  I was the initial provider who evaluated this patient. I have signed out the follow up of any pending tests (i.e. labs, radiological studies) to the ACP.  I have communicated the patient’s plan of care and disposition with the ACP.

## 2019-09-03 NOTE — ED STATDOCS - CARE PLAN
Principal Discharge DX:	Closed fracture of left wrist with routine healing, subsequent encounter  Goal:	admit for reduction

## 2019-09-03 NOTE — DISCHARGE NOTE PROVIDER - CARE PROVIDER_API CALL
Mello Hill)  Orthopaedic Surgery; Surgery of the Hand  [address] [place] [address]  Phone: [phone]  Fax: [phone]  Follow Up Time:

## 2019-09-03 NOTE — BRIEF OPERATIVE NOTE - NSICDXBRIEFPROCEDURE_GEN_ALL_CORE_FT
PROCEDURES:  Open reduction and internal fixation (ORIF) of 2-part intra-articular fracture of distal radius 03-Sep-2019 20:04:06  Skyler Arredondo

## 2019-09-03 NOTE — ED STATDOCS - NS ED ROS FT
Review of Systems:  	•	CONSTITUTIONAL: no fever  	•	SKIN: no rash  	•	RESPIRATORY: no shortness of breath  	•	CARDIAC: no chest pain, no palpitations  	•	GI:  no abd pain, no nausea, no vomiting, no diarrhea  	•	GENITO-URINARY:  no dysuria; no hematuria    	•	MUSCULOSKELETAL:  +left wrist pain, +left finger swelling, no back pain  	•	NEUROLOGIC: no weakness  	•	ALLERGY: no rhinitis  	•	PSYSCHIATRIC: no anxiety

## 2019-09-03 NOTE — H&P ADULT - NSHPSOCIALHISTORY_GEN_ALL_CORE
Denies tobacco use, recreational drug use  Admits to 2 alcoholic beverages per week  Lives with  and 2 daughters

## 2019-09-03 NOTE — H&P ADULT - NSHPPHYSICALEXAM_GEN_ALL_CORE
GEN: NAD, Awake and alert    LUE: In sugar tong splint that is c/d/i, C5-T1 SILT, motor grossly intact throughout musculocutaneous/axillary/ulnar/median/radial nerves, +radial pulse, compartments soft and compressible GEN: NAD, Awake and alert    LUE: In sugar tong splint that is c/d/i, C5-T1 SILT, motor grossly intact throughout musculocutaneous/axillary/ulnar/median/radial nerves, WWP, compartments soft and compressible

## 2019-09-03 NOTE — H&P ADULT - NSHPLABSRESULTS_GEN_ALL_CORE
LABS:                        14.0   7.12  )-----------( 248      ( 03 Sep 2019 13:33 )             41.6     09-03    143  |  107  |  12  ----------------------------<  86  3.8   |  30  |  0.79    Ca    8.7      03 Sep 2019 13:31    TPro  7.8  /  Alb  4.2  /  TBili  0.5  /  DBili  x   /  AST  19  /  ALT  20  /  AlkPhos  42  09-03    PT/INR - ( 03 Sep 2019 13:31 )   PT: 11.6 sec;   INR: 1.04 ratio         PTT - ( 03 Sep 2019 13:31 )  PTT:37.9 sec

## 2019-09-03 NOTE — DISCHARGE NOTE PROVIDER - NSDCCPCAREPLAN_GEN_ALL_CORE_FT
PRINCIPAL DISCHARGE DIAGNOSIS  Diagnosis: Closed fracture of left wrist with routine healing, subsequent encounter  Assessment and Plan of Treatment:

## 2019-09-03 NOTE — ED STATDOCS - CLINICAL SUMMARY MEDICAL DECISION MAKING FREE TEXT BOX
Pt here with left wrist and left forearms pain s/p mechanical slip and fall 2 days ago with distal radius fracture. Pt spoke to Dr. Hill who asked pt to come back to ED for further management.   Will get repeat XRs and consult ortho.

## 2019-09-03 NOTE — ED STATDOCS - OBJECTIVE STATEMENT
Pertinent HPI/PMH/PSH/FHx/SHx and Review of Systems contained within  HPI: 52 yo female p/w CC left wrist pain and left forearm pain. Pt was seen 2 days ago and had wrist placed in splint. This morning pt experiencing worsening wrist pain and +swelling of left digits. Pt states she is meeting her orthopedist here in [facility].  PMH/PSH relevant for:  ROS negative for: fever, Chest pain, SOB, Nausea, vomiting, diarrhea, abdominal pain, dysuria    FamilyHx and SocialHx not otherwise contributory

## 2019-09-03 NOTE — ED STATDOCS - PHYSICAL EXAMINATION
*GEN: NAD; well appearing; A+O x3   *HEAD: NC/AT   *EYES/NOSE: PERRL & EOMI b/l  *THROAT: airway patent, moist mucous membranes  *NECK: Neck supple, no masses  *PULMONARY: CTA b/l, symmetric breath sounds.   *CARDIAC: s1s2, regular rhythm, no Murmur  *ABDOMEN:  ND, NT, soft, no guarding, no rebound, no masses   *BACK: no CVA tenderness, Normal  spine   *EXTREMITIES: symmetric pulses, 2+ dp & radial pulses, capillary refill < 2 seconds, no cyanosis, no edema   *SKIN: no rash or bruising   *NEUROLOGIC: alert, CN 2-12 intact, moves all 4 extremities, full active & passive ROM in all extremities, normal baseline gait  *PSYCH: insight and judgment nl, memory nl, affect nl, thought nl
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
## 2019-09-05 DIAGNOSIS — E03.9 HYPOTHYROIDISM, UNSPECIFIED: ICD-10-CM

## 2019-09-05 DIAGNOSIS — Z79.891 LONG TERM (CURRENT) USE OF OPIATE ANALGESIC: ICD-10-CM
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
## 2019-09-05 DIAGNOSIS — S52.572A OTHER INTRAARTICULAR FRACTURE OF LOWER END OF LEFT RADIUS, INITIAL ENCOUNTER FOR CLOSED FRACTURE: ICD-10-CM

## 2019-09-05 DIAGNOSIS — S52.612A DISPLACED FRACTURE OF LEFT ULNA STYLOID PROCESS, INITIAL ENCOUNTER FOR CLOSED FRACTURE: ICD-10-CM

## 2019-09-05 DIAGNOSIS — Z79.82 LONG TERM (CURRENT) USE OF ASPIRIN: ICD-10-CM
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
## 2019-09-05 DIAGNOSIS — M25.532 PAIN IN LEFT WRIST: ICD-10-CM
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
## 2020-11-25 ENCOUNTER — APPOINTMENT (OUTPATIENT)
Dept: OBGYN | Facility: CLINIC | Age: 55
End: 2020-11-25
Payer: COMMERCIAL

## 2020-11-25 VITALS
DIASTOLIC BLOOD PRESSURE: 68 MMHG | HEIGHT: 65 IN | TEMPERATURE: 97.5 F | WEIGHT: 135 LBS | SYSTOLIC BLOOD PRESSURE: 108 MMHG | BODY MASS INDEX: 22.49 KG/M2 | RESPIRATION RATE: 16 BRPM

## 2020-11-25 DIAGNOSIS — Z12.31 ENCOUNTER FOR SCREENING MAMMOGRAM FOR MALIGNANT NEOPLASM OF BREAST: ICD-10-CM

## 2020-11-25 DIAGNOSIS — Z72.89 OTHER PROBLEMS RELATED TO LIFESTYLE: ICD-10-CM

## 2020-11-25 DIAGNOSIS — Z01.419 ENCOUNTER FOR GYNECOLOGICAL EXAMINATION (GENERAL) (ROUTINE) W/OUT ABNORMAL FINDINGS: ICD-10-CM

## 2020-11-25 DIAGNOSIS — E03.9 HYPOTHYROIDISM, UNSPECIFIED: ICD-10-CM

## 2020-11-25 DIAGNOSIS — L85.3 XEROSIS CUTIS: ICD-10-CM

## 2020-11-25 PROBLEM — Z00.00 ENCOUNTER FOR PREVENTIVE HEALTH EXAMINATION: Status: ACTIVE | Noted: 2020-11-25

## 2020-11-25 PROCEDURE — 99072 ADDL SUPL MATRL&STAF TM PHE: CPT

## 2020-11-25 PROCEDURE — 99386 PREV VISIT NEW AGE 40-64: CPT

## 2020-11-25 RX ORDER — LEVOTHYROXINE SODIUM 50 UG/1
50 TABLET ORAL
Refills: 0 | Status: ACTIVE | COMMUNITY

## 2020-11-25 NOTE — PHYSICAL EXAM
[Appropriately responsive] : appropriately responsive [Alert] : alert [No Acute Distress] : no acute distress [Regular Rate Rhythm] : regular rate rhythm [Soft] : soft [Non-tender] : non-tender [Non-distended] : non-distended [No HSM] : No HSM [No Lesions] : no lesions [No Mass] : no mass [Oriented x3] : oriented x3 [Examination Of The Breasts] : a normal appearance [No Masses] : no breast masses were palpable [Labia Majora] : normal [Labia Minora] : normal [Normal] : normal [Tenderness] : nontender [Retroversion] : retroverted [Enlarged ___ wks] : not enlarged [Mass ___ cm] : no uterine mass was palpated [Uterine Adnexae] : non-palpable [No Tenderness] : no tenderness [FreeTextEntry5] : pap done [FreeTextEntry9] : guaiac-

## 2020-11-25 NOTE — HISTORY OF PRESENT ILLNESS
[Patient reported mammogram was normal] : Patient reported mammogram was normal [Patient reported breast sonogram was normal] : Patient reported breast sonogram was normal [Patient reported PAP Smear was normal] : Patient reported PAP Smear was normal [Patient reported bone density results were normal] : Patient reported bone density results were normal [Patient reported colonoscopy was normal] : Patient reported colonoscopy was normal [Men] : men [Vaginal] : vaginal [TextBox_4] : Last exam 4/19, pap nromal, no hx of abn paps. Takes vit d and exercises.\par LMP 2013, no vb since, no hot flashes [Mammogramdate] : 4/19 [BreastSonogramDate] : 4/19 [PapSmeardate] : 4/19 [BoneDensityDate] : 2 yrs ago [TextBox_37] : min osteopenia [ColonoscopyDate] : 2019 [TextBox_43] : fu 5 yrs [FreeTextEntry1] : dryness, lubricants help

## 2020-11-27 LAB — HPV HIGH+LOW RISK DNA PNL CVX: NOT DETECTED

## 2020-12-23 PROBLEM — Z12.31 ENCOUNTER FOR SCREENING MAMMOGRAM FOR MALIGNANT NEOPLASM OF BREAST: Status: RESOLVED | Noted: 2020-11-25 | Resolved: 2020-12-23

## 2020-12-23 PROBLEM — Z01.419 ENCOUNTER FOR GYNECOLOGICAL EXAMINATION: Status: RESOLVED | Noted: 2020-11-25 | Resolved: 2020-12-23

## 2021-02-05 ENCOUNTER — TRANSCRIPTION ENCOUNTER (OUTPATIENT)
Age: 56
End: 2021-02-05

## 2022-01-03 ENCOUNTER — TRANSCRIPTION ENCOUNTER (OUTPATIENT)
Age: 57
End: 2022-01-03

## 2022-02-21 ENCOUNTER — TRANSCRIPTION ENCOUNTER (OUTPATIENT)
Age: 57
End: 2022-02-21

## 2022-12-06 ENCOUNTER — NON-APPOINTMENT (OUTPATIENT)
Age: 57
End: 2022-12-06

## 2023-05-13 ENCOUNTER — NON-APPOINTMENT (OUTPATIENT)
Age: 58
End: 2023-05-13

## 2024-02-16 NOTE — PROVIDER CONTACT NOTE (OTHER) - SITUATION
comfortable appearance/cooperative/family/SO at bedside comfortable appearance/family/SO at bedside/resting/sleeping Consult called in to service. Spoke with Chanel

## 2024-03-14 NOTE — BEDSIDE PROCEDURE TIME OUT CHECKLIST - NSTIMECONSENTSD_GEN_ALL_CORE
Eat healthy foods you enjoy. Apixaban/Eliquis DOES NOT have a special diet. Limit your alcohol intake. done